# Patient Record
(demographics unavailable — no encounter records)

---

## 2024-11-18 NOTE — HISTORY OF PRESENT ILLNESS
[Never] : never [TextBox_4] : PAPO L[place]LE 82 year F returns to clinic for follow up Annual Physical Evaluation.  Patient has no new complaints at this time.  Patient is eating well and exercising regularly including yoga.  Patient denies any new medical history in the past year patient provide lab results done 11/7/2024 at [facility] abnormal labs included elevated LDL at 109 and elevated hemoglobin A1c at 5.8.  Otherwise all other blood work was within normal limits.  Patient sleeps well and states she had her flu shot this year and will be getting a COVID booster shot next week at [facility].

## 2024-11-18 NOTE — HISTORY OF PRESENT ILLNESS
[Never] : never [TextBox_4] : PAPO L[place]LE 82 year F returns to clinic for follow up Annual Physical Evaluation.  Patient has no new complaints at this time.  Patient is eating well and exercising regularly including yoga.  Patient denies any new medical history in the past year patient provide lab results done 11/7/2024 at [facility] abnormal labs included elevated LDL at 109 and elevated hemoglobin A1c at 5.8.  Otherwise all other blood work was within normal limits.  Patient sleeps well and states she had her flu shot this year and will be getting a COVID booster shot next week at [facility].

## 2024-11-18 NOTE — HISTORY OF PRESENT ILLNESS
[Never] : never [TextBox_4] : PAPO L[place]LE 82 year F returns to clinic for follow up Annual Physical Evaluation.  Patient has no new complaints at this time.  Patient is eating well and exercising regularly including yoga.  Patient denies any new medical history in the past year patient provide lab results done 11/7/2024 at [email or web address] abnormal labs included elevated LDL at 109 and elevated hemoglobin A1c at 5.8.  Otherwise all other blood work was within normal limits.  Patient sleeps well and states she had her flu shot this year and will be getting a COVID booster shot next week at [facility].

## 2024-11-18 NOTE — HISTORY OF PRESENT ILLNESS
[Never] : never [TextBox_4] : PAPO L[place]LE 82 year F returns to clinic for follow up Annual Physical Evaluation.  Patient has no new complaints at this time.  Patient is eating well and exercising regularly including yoga.  Patient denies any new medical history in the past year patient provide lab results done 11/7/2024 at [employer] abnormal labs included elevated LDL at 109 and elevated hemoglobin A1c at 5.8.  Otherwise all other blood work was within normal limits.  Patient sleeps well and states she had her flu shot this year and will be getting a COVID booster shot next week at [facility].

## 2024-11-18 NOTE — REVIEW OF SYSTEMS
done [Sore Throat] : no sore throat [Eye Irritation] : no eye irritation [Syncope] : no syncope [Food Intolerance] : no food intolerance [Rash] : no rash [Itch] : no itch [Anemia] : no anemia [History of Iron Deficiency] : no history of iron deficiency [Seizures] : no seizures [Depression] : no depression [Anxiety] : no anxiety [Panic Attacks] : no panic attacks [Obesity] : no obesity [TextBox_44] : Pacemaker for AFIB [TextBox_113] : On Xarelto due to AFIB [Negative] : Endocrine [Fever] : no fever [Fatigue] : no fatigue [Chills] : no chills [Nasal Congestion] : no nasal congestion [Postnasal Drip] : no postnasal drip [Sinus Problems] : no sinus problems [Cough] : no cough [Frequent URIs] : no frequent URIs [Dyspnea] : no dyspnea [Wheezing] : no wheezing [SOB on Exertion] : no sob on exertion [Chest Discomfort] : no chest discomfort [Edema] : no edema [Palpitations] : no palpitations [Seasonal Allergies] : no seasonal allergies [Nasal Discharge] : no nasal discharge [GERD] : no gerd [Abdominal Pain] : no abdominal pain [Diarrhea] : no diarrhea [Constipation] : no constipation [Trauma/ Injury] : no trauma/ injury [Chronic Pain] : no chronic pain [Headache] : no headache [Dizziness] : no dizziness [Numbness] : no numbness [Memory Loss] : no memory loss [Diabetes] : no diabetes [Thyroid Problem] : no thyroid problem

## 2024-11-18 NOTE — HISTORY OF PRESENT ILLNESS
[Never] : never [TextBox_4] : PAPO LITTLE 82 year F returns to clinic for follow up Annual Physical Evaluation.  Patient has no new complaints at this time.  Patient is eating well and exercising regularly including yoga.  Patient denies any new medical history in the past year patient provide lab results done 11/7/2024 at [employer] [facility] abnormal labs included elevated LDL at 109 and elevated hemoglobin A1c at 5.8.  Otherwise all other blood work was within normal limits.  Patient sleeps well and states she had her flu shot this year and will be getting a COVID booster shot next week at [facility].

## 2024-11-18 NOTE — HISTORY OF PRESENT ILLNESS
[Never] : never [TextBox_4] : PAPO L[place]LE 82 year F returns to clinic for follow up Annual Physical Evaluation.  Patient has no new complaints at this time.  Patient is eating well and exercising regularly including yoga.  Patient denies any new medical history in the past year patient provide lab results done 11/7/2024 at [facility] abnormal labs included elevated LDL at 109 and elevated hemoglobin A1c at 5.8.  Otherwise all other blood work was within normal limits.  Patient sleeps well and states she had her flu shot this year and will be getting a COVID booster shot next week at [facility].

## 2024-11-18 NOTE — PHYSICAL EXAM
[Benign] : benign [No Acute Distress] : no acute distress [Well Nourished] : well nourished [Well Groomed] : well groomed [Normal Oropharynx] : normal oropharynx [Normal Appearance] : normal appearance [Thyroid Not Enlarged] : thyroid not enlarged [No Neck Mass] : no neck mass [Normal Rate/Rhythm] : normal rate/rhythm [Normal S1, S2] : normal s1, s2 [No Murmurs] : no murmurs [No Resp Distress] : no resp distress [Clear to Auscultation Bilaterally] : clear to auscultation bilaterally [No Abnormalities] : no abnormalities [Not Tender] : not tender [Soft] : soft [Normal Bowel Sounds] : normal bowel sounds [Normal Gait] : normal gait [No Edema] : no edema [FROM] : FROM [Normal Color/ Pigmentation] : normal color/ pigmentation [No Focal Deficits] : no focal deficits [Oriented x3] : oriented x3 [Normal Insight/judgment] : normal insight/judgment [Normal Affect] : normal affect [Over the Past 2 Weeks, Have You Felt Down, Depressed, or Hopeless?] : 1.) Over the past 2 weeks, have you felt down, depressed, or hopeless? No [Over the Past 2 Weeks, Have You Felt Little Interest or Pleasure Doing Things?] : 2.) Over the past 2 weeks, have you felt little interest or pleasure doing things? No

## 2024-11-18 NOTE — REVIEW OF SYSTEMS
[Sore Throat] : no sore throat [Eye Irritation] : no eye irritation [Syncope] : no syncope [Food Intolerance] : no food intolerance [Rash] : no rash [Itch] : no itch [Anemia] : no anemia [History of Iron Deficiency] : no history of iron deficiency [Seizures] : no seizures [Depression] : no depression [Anxiety] : no anxiety [Panic Attacks] : no panic attacks [Obesity] : no obesity [TextBox_44] : Pacemaker for AFIB [TextBox_113] : On Xarelto due to AFIB [Negative] : Endocrine [Fever] : no fever [Fatigue] : no fatigue [Chills] : no chills [Nasal Congestion] : no nasal congestion [Postnasal Drip] : no postnasal drip [Sinus Problems] : no sinus problems [Cough] : no cough [Frequent URIs] : no frequent URIs [Dyspnea] : no dyspnea [Wheezing] : no wheezing [SOB on Exertion] : no sob on exertion [Chest Discomfort] : no chest discomfort [Edema] : no edema [Palpitations] : no palpitations [Seasonal Allergies] : no seasonal allergies [Nasal Discharge] : no nasal discharge [GERD] : no gerd [Abdominal Pain] : no abdominal pain [Diarrhea] : no diarrhea [Constipation] : no constipation [Trauma/ Injury] : no trauma/ injury [Chronic Pain] : no chronic pain [Headache] : no headache [Dizziness] : no dizziness [Numbness] : no numbness [Memory Loss] : no memory loss [Diabetes] : no diabetes [Thyroid Problem] : no thyroid problem

## 2024-11-18 NOTE — DISCUSSION/SUMMARY
[FreeTextEntry1] : #1 Elevated LDL-t> continue healthy diet and regular exercise. #2 Prediabetes--> continue healthy diet and regular exercise. #3 Osteopenia--> continue taking multiple vitamins and calcium supplements as directed and continue exercise to strengthen muscles and bones. #4 A-fib/PVCs/long-term use of anticoagulants/implanted pacemaker--> continue lifelong Xarelto 15 mg daily, continue metoprolol ER 50 mg once daily.  Continue follow-up with cardiac as directed. #5 patient will follow-up in our office annually or as needed for any new symptoms.  Will order annual labs labs and follow-up results.

## 2025-01-09 NOTE — HISTORY OF PRESENT ILLNESS
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 She uses a device that checks here rhythm at home periodically, and she states it has not revealed any AF

## 2025-01-09 NOTE — CARDIOLOGY SUMMARY
[de-identified] : 1/9/25 NSR 8/22/24 NSR [de-identified] : 6/13/24-6/27/24 Telemetry monitor reviewed and ordered for c/o palpitations. Date of service 6/13-6/27 findings demonstrate multiple frequent runs of SVT/AT noted, Max , Min HR 51 (nocturnal), AVG HR 72, predominate rhythm is sinus.  [de-identified] : 7/22/24: Baseline electrocardiogram: Normal sinus rhythm at a rate of 75 bpm with no arrhythmias. Stress electrocardiogram: No ischemic ST segment changes. Arrhythmias: No ectopy noted. The heart rate response was normal. The blood pressure response was normal. The patient achieved 7 METS, which is consistent with average exercise capacity. [de-identified] : 9/7/23: 1. Left ventricular systolic function is normal with an ejection fraction visually estimated at 65 to 70 %. 2. Normal right ventricular cavity size and normal right ventricular systolic function. 3. Mild mitral regurgitation. 4. Mild tricuspid regurgitation. [de-identified] : Carotid 8/24/21 Minimal disease NL velocities no significant stenosis

## 2025-01-09 NOTE — DISCUSSION/SUMMARY
[FreeTextEntry1] : 83 yo female with PAF, history of pre-syncope without recurrent symptoms. She is maintained on metoprolol and AC with Xarelto.  An echo cardiogram will be performed to assess chamber dimensions and valvular integrity. She will retrun for reevaluation in 6 months or sooner if necessary. [EKG obtained to assist in diagnosis and management of assessed problem(s)] : EKG obtained to assist in diagnosis and management of assessed problem(s)

## 2025-01-09 NOTE — PHYSICAL EXAM
[Normal] : well developed, well nourished, no acute distress [Normal Conjunctiva] : normal conjunctiva [Normal Venous Pressure] : normal venous pressure [No Carotid Bruit] : no carotid bruit [Normal S1, S2] : normal S1, S2 [No Murmur] : no murmur [No Rub] : no rub [No Gallop] : no gallop [Clear Lung Fields] : clear lung fields [Good Air Entry] : good air entry [No Respiratory Distress] : no respiratory distress  [Soft] : abdomen soft [Non Tender] : non-tender [Normal Gait] : normal gait [No Edema] : no edema [No Cyanosis] : no cyanosis [No Clubbing] : no clubbing [No Varicosities] : no varicosities [No Rash] : no rash [Moves all extremities] : moves all extremities [No Focal Deficits] : no focal deficits [Normal Speech] : normal speech [Alert and Oriented] : alert and oriented [Normal memory] : normal memory

## 2025-06-26 NOTE — PHYSICAL EXAM
[de-identified] : Right ankle Physical Examination:  General: Alert and oriented x3.  In no acute distress.  Pleasant in nature with a normal affect.  No apparent respiratory distress.  Erythema, Warmth, Rubor: Negative Swelling: Negative  ROM: 1. Dorsiflexion: 10 degrees 2. Plantarflexion: 40 degrees 3. Inversion: 30 degrees 4. Eversion: 20 degrees 5. Subtalar: 10 degrees  Tenderness to Palpation:  1. Lateral Malleolus: Negative 2. Medial Malleolus: Negative 3. Proximal Fibular Pain: Negative 4. Heel Pain: Negative 5. Cuboid: Negative 6. Navicular: Negative 7. Tibiotalar Joint: Negative 8. Subtalar Joint: Negative 9. Posterior Recess: Negative  Tendon Pain: 1. Achilles: Negative 2. Peroneals: Negative 3. Posterior Tibialis: Negative 4. Tibialis Anterior: Negative  Ligament Pain: 1. ATFL: Positive 2. CFL: Negative  3. PTFL: Negative 4. Deltoid Ligaments: Negative 5. Lis Franc Ligament: Negative  Stability:  1. Anterior Drawer: Negative 2. Posterior Drawer: Negative  Strength: 5/5 TA/GS/EHL  Pulses: 2+ DP/PT Pulses  Neuro: Intact motor and sensory  Additional Test: 1. Calcaneal Squeeze Test: Negative 2. Syndesmosis Squeeze Test: Negative [de-identified] : 3 views x-rays right ankle reviewed and taken on 6/26/2025: No fractures present.

## 2025-06-26 NOTE — HISTORY OF PRESENT ILLNESS
[FreeTextEntry1] : The patient is a 83-year-old female who presents with right ankle intermittent pains, lateral ankle.  Patient is active, yoga and walks and has intermittent pains to the lateral ankle without trauma.  She has no pain at rest today in the office.  She presents wearing sandals, walking without assistance.

## 2025-06-26 NOTE — DISCUSSION/SUMMARY
[de-identified] : Assessment: Right ankle pain  Plan: 1. Physical Therapy prescription given for strengthening conditioning program right ankle.  Home exercise sheet given as well.  OTC ankle sleeve. 2.  OTC NSAIDs/Tylenol as needed for pain.  3. Return to normal activities as tolerated.  4. Continue with ice and heat therapy.  5. All questions answered.  Follow-up as needed. If pain persists consider advanced imaging in the future.  The patient understood the treatment plan.

## 2025-07-07 NOTE — HISTORY OF PRESENT ILLNESS
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 She remains active and has recently seen Ortho for right ankle pain. Voltaren gel was prescribed.

## 2025-07-07 NOTE — DISCUSSION/SUMMARY
[FreeTextEntry1] : 84 yo female with PAF, history of pre-syncope without recurrent symptoms. She is maintained on metoprolol and AC with Xarelto.  Echo was reviewed revealing normal EF.  [EKG obtained to assist in diagnosis and management of assessed problem(s)] : EKG obtained to assist in diagnosis and management of assessed problem(s)

## 2025-07-07 NOTE — CARDIOLOGY SUMMARY
[de-identified] : 1/9/25 NSR 8/22/24 NSR [de-identified] : 6/13/24-6/27/24 Telemetry monitor reviewed and ordered for c/o palpitations. Date of service 6/13-6/27 findings demonstrate multiple frequent runs of SVT/AT noted, Max , Min HR 51 (nocturnal), AVG HR 72, predominate rhythm is sinus.  [de-identified] : 7/22/24: Baseline electrocardiogram: Normal sinus rhythm at a rate of 75 bpm with no arrhythmias. Stress electrocardiogram: No ischemic ST segment changes. Arrhythmias: No ectopy noted. The heart rate response was normal. The blood pressure response was normal. The patient achieved 7 METS, which is consistent with average exercise capacity. [de-identified] : 9/7/23: 1. Left ventricular systolic function is normal with an ejection fraction visually estimated at 65 to 70 %. 2. Normal right ventricular cavity size and normal right ventricular systolic function. 3. Mild mitral regurgitation. 4. Mild tricuspid regurgitation. [de-identified] : Carotid 8/24/21 Minimal disease NL velocities no significant stenosis